# Patient Record
Sex: FEMALE | Race: WHITE | NOT HISPANIC OR LATINO | Employment: OTHER | URBAN - METROPOLITAN AREA
[De-identification: names, ages, dates, MRNs, and addresses within clinical notes are randomized per-mention and may not be internally consistent; named-entity substitution may affect disease eponyms.]

---

## 2018-08-13 RX ORDER — HYDROCHLOROTHIAZIDE 25 MG/1
25 TABLET ORAL DAILY
COMMUNITY
End: 2022-09-30 | Stop reason: SDUPTHER

## 2018-08-13 RX ORDER — CETIRIZINE HYDROCHLORIDE 10 MG/1
10 TABLET ORAL DAILY
COMMUNITY
End: 2022-08-02

## 2018-08-13 RX ORDER — ATORVASTATIN CALCIUM 20 MG/1
20 TABLET, FILM COATED ORAL DAILY
COMMUNITY
End: 2022-09-30

## 2018-08-13 RX ORDER — LISINOPRIL 10 MG/1
10 TABLET ORAL DAILY
COMMUNITY
End: 2022-08-24

## 2018-08-14 ENCOUNTER — ANESTHESIA (OUTPATIENT)
Dept: GASTROENTEROLOGY | Facility: HOSPITAL | Age: 73
End: 2018-08-14

## 2018-08-14 ENCOUNTER — ANESTHESIA EVENT (OUTPATIENT)
Dept: GASTROENTEROLOGY | Facility: HOSPITAL | Age: 73
End: 2018-08-14

## 2018-08-14 ENCOUNTER — HOSPITAL ENCOUNTER (OUTPATIENT)
Facility: HOSPITAL | Age: 73
Setting detail: HOSPITAL OUTPATIENT SURGERY
Discharge: HOME | End: 2018-08-14
Attending: INTERNAL MEDICINE | Admitting: INTERNAL MEDICINE

## 2018-08-14 ENCOUNTER — ON CAMPUS - OUTPATIENT (OUTPATIENT)
Dept: URBAN - METROPOLITAN AREA HOSPITAL 114 | Facility: HOSPITAL | Age: 73
End: 2018-08-14
Payer: COMMERCIAL

## 2018-08-14 VITALS
HEIGHT: 64 IN | BODY MASS INDEX: 32.1 KG/M2 | DIASTOLIC BLOOD PRESSURE: 56 MMHG | OXYGEN SATURATION: 99 % | HEART RATE: 61 BPM | TEMPERATURE: 97.6 F | WEIGHT: 188 LBS | RESPIRATION RATE: 16 BRPM | SYSTOLIC BLOOD PRESSURE: 110 MMHG

## 2018-08-14 DIAGNOSIS — Z12.11 ENCOUNTER FOR SCREENING FOR MALIGNANT NEOPLASM OF COLON: ICD-10-CM

## 2018-08-14 DIAGNOSIS — K57.30 DIVERTICULOSIS OF LARGE INTESTINE WITHOUT PERFORATION OR ABS: ICD-10-CM

## 2018-08-14 PROCEDURE — 25010000002 PROPOFOL 10 MG/ML EMULSION: Performed by: NURSE ANESTHETIST, CERTIFIED REGISTERED

## 2018-08-14 PROCEDURE — 45378 DIAGNOSTIC COLONOSCOPY: CPT | Performed by: INTERNAL MEDICINE

## 2018-08-14 RX ORDER — PROMETHAZINE HYDROCHLORIDE 25 MG/ML
12.5 INJECTION, SOLUTION INTRAMUSCULAR; INTRAVENOUS ONCE AS NEEDED
Status: DISCONTINUED | OUTPATIENT
Start: 2018-08-14 | End: 2018-08-14 | Stop reason: HOSPADM

## 2018-08-14 RX ORDER — PROPOFOL 10 MG/ML
VIAL (ML) INTRAVENOUS CONTINUOUS PRN
Status: DISCONTINUED | OUTPATIENT
Start: 2018-08-14 | End: 2018-08-14 | Stop reason: SURG

## 2018-08-14 RX ORDER — LIDOCAINE HYDROCHLORIDE 20 MG/ML
INJECTION, SOLUTION INFILTRATION; PERINEURAL AS NEEDED
Status: DISCONTINUED | OUTPATIENT
Start: 2018-08-14 | End: 2018-08-14 | Stop reason: SURG

## 2018-08-14 RX ORDER — PROMETHAZINE HYDROCHLORIDE 25 MG/1
25 SUPPOSITORY RECTAL ONCE AS NEEDED
Status: DISCONTINUED | OUTPATIENT
Start: 2018-08-14 | End: 2018-08-14 | Stop reason: HOSPADM

## 2018-08-14 RX ORDER — PROPOFOL 10 MG/ML
VIAL (ML) INTRAVENOUS AS NEEDED
Status: DISCONTINUED | OUTPATIENT
Start: 2018-08-14 | End: 2018-08-14 | Stop reason: SURG

## 2018-08-14 RX ORDER — SODIUM CHLORIDE, SODIUM LACTATE, POTASSIUM CHLORIDE, CALCIUM CHLORIDE 600; 310; 30; 20 MG/100ML; MG/100ML; MG/100ML; MG/100ML
1000 INJECTION, SOLUTION INTRAVENOUS CONTINUOUS
Status: DISCONTINUED | OUTPATIENT
Start: 2018-08-14 | End: 2018-08-14 | Stop reason: HOSPADM

## 2018-08-14 RX ORDER — PROMETHAZINE HYDROCHLORIDE 25 MG/1
25 TABLET ORAL ONCE AS NEEDED
Status: DISCONTINUED | OUTPATIENT
Start: 2018-08-14 | End: 2018-08-14 | Stop reason: HOSPADM

## 2018-08-14 RX ORDER — ONDANSETRON 2 MG/ML
4 INJECTION INTRAMUSCULAR; INTRAVENOUS ONCE AS NEEDED
Status: DISCONTINUED | OUTPATIENT
Start: 2018-08-14 | End: 2018-08-14 | Stop reason: HOSPADM

## 2018-08-14 RX ORDER — SODIUM CHLORIDE, SODIUM LACTATE, POTASSIUM CHLORIDE, CALCIUM CHLORIDE 600; 310; 30; 20 MG/100ML; MG/100ML; MG/100ML; MG/100ML
100 INJECTION, SOLUTION INTRAVENOUS CONTINUOUS
Status: DISCONTINUED | OUTPATIENT
Start: 2018-08-14 | End: 2018-08-14 | Stop reason: HOSPADM

## 2018-08-14 RX ADMIN — SODIUM CHLORIDE, POTASSIUM CHLORIDE, SODIUM LACTATE AND CALCIUM CHLORIDE 1000 ML: 600; 310; 30; 20 INJECTION, SOLUTION INTRAVENOUS at 10:50

## 2018-08-14 RX ADMIN — PROPOFOL 250 MCG/KG/MIN: 10 INJECTION, EMULSION INTRAVENOUS at 11:30

## 2018-08-14 RX ADMIN — LIDOCAINE HYDROCHLORIDE 50 MG: 20 INJECTION, SOLUTION INFILTRATION; PERINEURAL at 11:30

## 2018-08-14 RX ADMIN — PROPOFOL 60 MG: 10 INJECTION, EMULSION INTRAVENOUS at 11:30

## 2018-08-14 NOTE — ADDENDUM NOTE
Addendum  created 08/14/18 1350 by Eleonora Pfeiffer CRNA    Anesthesia Intra Flowsheets edited

## 2018-08-14 NOTE — H&P
Patient Care Team:  Rubén Coleman MD as PCP - General (Family Medicine)    Chief complaint hx of polyps    Subjective     History of Present Illness    Review of Systems   All other systems reviewed and are negative.       Past Medical History:   Diagnosis Date   • GERD (gastroesophageal reflux disease)    • History of colon polyps    • Hyperlipidemia    • Hypertension    • IBS (irritable bowel syndrome)      Past Surgical History:   Procedure Laterality Date   • BLADDER SLING MODIFIED, ANTERIOR AND POSTERIOR VAGINAL REPAIR     • CATARACT EXTRACTION EXTRACAPSULAR W/ INTRAOCULAR LENS IMPLANTATION Bilateral    • CHOLECYSTECTOMY  1999   • COLONOSCOPY  2013   • HYSTERECTOMY       Family History   Problem Relation Age of Onset   • Malig Hyperthermia Neg Hx      Social History   Substance Use Topics   • Smoking status: Never Smoker   • Smokeless tobacco: Not on file   • Alcohol use No     Prescriptions Prior to Admission   Medication Sig Dispense Refill Last Dose   • atorvastatin (LIPITOR) 20 MG tablet Take 20 mg by mouth Daily.   8/13/2018 at Unknown time   • cetirizine (zyrTEC) 10 MG tablet Take 10 mg by mouth Daily.   8/13/2018 at Unknown time   • hydrochlorothiazide (HYDRODIURIL) 25 MG tablet Take 25 mg by mouth Daily.   8/13/2018 at Unknown time   • lisinopril (PRINIVIL,ZESTRIL) 10 MG tablet Take 10 mg by mouth Daily.   8/13/2018 at Unknown time   • Multiple Vitamins-Minerals (MULTIVITAMIN ADULT PO) Take 1 tablet by mouth Daily.   8/13/2018 at Unknown time     Allergies:  Patient has no known allergies.    Objective      Vital Signs  Temp:  [97.6 °F (36.4 °C)] 97.6 °F (36.4 °C)  Heart Rate:  [67] 67  Resp:  [16] 16    Physical Exam   Constitutional: She is oriented to person, place, and time. She appears well-developed and well-nourished.   Eyes: Conjunctivae are normal.   Neck: Neck supple.   Cardiovascular: Normal rate and regular rhythm.    Pulmonary/Chest: Effort normal and breath sounds normal.    Abdominal: Soft. Bowel sounds are normal.   Neurological: She is alert and oriented to person, place, and time.   Skin: Skin is warm and dry.   Psychiatric: She has a normal mood and affect.       Results Review:   I reviewed the patient's new clinical results.      Assessment/Plan     Active Problems:    * No active hospital problems. *      Assessment:  (Persona history colon polyps ).     Plan:   (Colonoscopy risks, alternatives and benefits discussed with patient and the patient is agreeable to having procedure done.).       I discussed the patients findings and my recommendations with patient and nursing staff    Reggie Baum MD  08/14/18  11:28 AM

## 2018-08-14 NOTE — ANESTHESIA PREPROCEDURE EVALUATION
Anesthesia Evaluation     Patient summary reviewed and Nursing notes reviewed   NPO Solid Status: > 8 hours  NPO Liquid Status: > 8 hours           Airway   Mallampati: II  Dental - normal exam     Pulmonary - normal exam   Cardiovascular - normal exam    (+) hypertension less than 2 medications, hyperlipidemia,       Neuro/Psych  GI/Hepatic/Renal/Endo    (+) obesity,       Musculoskeletal     Abdominal    Substance History      OB/GYN          Other                        Anesthesia Plan    ASA 2     MAC     Anesthetic plan and risks discussed with patient and child.

## 2018-08-14 NOTE — ANESTHESIA POSTPROCEDURE EVALUATION
"Patient: Idania Phillips    Procedure Summary     Date:  08/14/18 Room / Location:   [facility]    Anesthesia Start:  1126 Anesthesia Stop:  1154    Procedure:  COLONOSCOPY to Cecum and Terminal Ileum (N/A ) Diagnosis:      Surgeon:  Reggie Baum MD Provider:  Tea Luna MD    Anesthesia Type:  MAC ASA Status:  2          Anesthesia Type: MAC  Last vitals  BP   105/55 (08/14/18 1202)   Temp   36.4 °C (97.6 °F) (08/14/18 1039)   Pulse   71 (08/14/18 1202)   Resp   16 (08/14/18 1202)     SpO2   96 % (08/14/18 1202)     Post Anesthesia Care and Evaluation    Patient location during evaluation: bedside  Patient participation: complete - patient participated  Level of consciousness: awake and alert  Pain management: adequate  Airway patency: patent  Anesthetic complications: No anesthetic complications    Cardiovascular status: acceptable  Respiratory status: acceptable  Hydration status: acceptable    Comments: /55 (BP Location: Left arm, Patient Position: Sitting)   Pulse 71   Temp 36.4 °C (97.6 °F) (Oral)   Resp 16   Ht 162.5 cm (63.98\")   Wt 85.3 kg (188 lb)   SpO2 96%   BMI 32.29 kg/m²       "

## 2022-08-02 RX ORDER — LEVOCETIRIZINE DIHYDROCHLORIDE 5 MG/1
TABLET, FILM COATED ORAL
Qty: 90 TABLET | Refills: 0 | Status: SHIPPED | OUTPATIENT
Start: 2022-08-02 | End: 2022-08-04 | Stop reason: SDUPTHER

## 2022-08-04 NOTE — TELEPHONE ENCOUNTER
Dr. Coleman, there is an issue with this patient's medication. She was last seen in November of 2021. Patient was previously on 80mg atorvastatin-she took a 20mg and a 40mg. At her last visit on 11/23/21 her 40mg was inactivated on her medication list. I know [employer] will stop medications on it's own, but this medication was taken off because the stop date of 11/23/21 is listed in the note under the medications. There isn't anything in the note stating you were stopping the medication though. There is another medication list on the note itself that says the 40mg was refilled on 11/23/21, but in the medication list, it was not refilled. Patient is requesting both. I scheduled her an appointment to see you not until end of sept. Do you want to fill it and continue to take if she had been taking? It is so confusing, im sorry!

## 2022-08-04 NOTE — TELEPHONE ENCOUNTER
Called patient, last ov was in jan 2021. Got her scheduled for upcoming appointment. Sending in her medication to last her to appointment

## 2022-08-04 NOTE — TELEPHONE ENCOUNTER
Caller: Idania Phillips    Relationship: Self    Best call back number:[phone]  Requested Prescriptions:   Requested Prescriptions     Pending Prescriptions Disp Refills   • atorvastatin (LIPITOR) 40 MG tablet [Pharmacy Med Name: ATORVASTATIN 40MG] 90 tablet 1     Sig: TAKE 1 TABLET BY MOUTH EVERY DAY   • atorvastatin (LIPITOR) 20 MG tablet 90 tablet      Sig: Take 1 tablet by mouth Daily.   • levocetirizine (XYZAL) 5 MG tablet 90 tablet 0     Sig: Take 1 tablet by mouth Every Evening.        [facility] [address] [facility] [phone] [facility] [phone] [facility]     Additional details provided by patient: OUT OF ATORVASTATIN    Does the patient have less than a 3 day supply:  [x] Yes  [] No    Blanca Vazquez Rep   08/04/22 12:21 EDT

## 2022-08-06 RX ORDER — LEVOCETIRIZINE DIHYDROCHLORIDE 5 MG/1
5 TABLET, FILM COATED ORAL EVERY EVENING
Qty: 90 TABLET | Refills: 0 | Status: SHIPPED | OUTPATIENT
Start: 2022-08-06 | End: 2022-09-30 | Stop reason: SDUPTHER

## 2022-08-06 RX ORDER — ATORVASTATIN CALCIUM 20 MG/1
20 TABLET, FILM COATED ORAL DAILY
Qty: 90 TABLET | Status: CANCELLED | OUTPATIENT
Start: 2022-08-06

## 2022-08-06 RX ORDER — ATORVASTATIN CALCIUM 40 MG/1
TABLET, FILM COATED ORAL
Qty: 90 TABLET | Refills: 1 | Status: SHIPPED | OUTPATIENT
Start: 2022-08-06 | End: 2022-08-24

## 2022-08-24 ENCOUNTER — OFFICE VISIT (OUTPATIENT)
Dept: FAMILY MEDICINE CLINIC | Facility: CLINIC | Age: 77
End: 2022-08-24

## 2022-08-24 VITALS
WEIGHT: 151.5 LBS | DIASTOLIC BLOOD PRESSURE: 82 MMHG | OXYGEN SATURATION: 97 % | HEIGHT: 61 IN | HEART RATE: 90 BPM | BODY MASS INDEX: 28.6 KG/M2 | SYSTOLIC BLOOD PRESSURE: 124 MMHG | TEMPERATURE: 97.7 F

## 2022-08-24 DIAGNOSIS — J40 BRONCHITIS: Primary | ICD-10-CM

## 2022-08-24 DIAGNOSIS — I10 PRIMARY HYPERTENSION: ICD-10-CM

## 2022-08-24 DIAGNOSIS — Z78.0 ENCOUNTER FOR OSTEOPOROSIS SCREENING IN ASYMPTOMATIC POSTMENOPAUSAL PATIENT: ICD-10-CM

## 2022-08-24 DIAGNOSIS — J30.2 SEASONAL ALLERGIES: ICD-10-CM

## 2022-08-24 DIAGNOSIS — Z00.00 ENCOUNTER FOR SUBSEQUENT ANNUAL WELLNESS VISIT (AWV) IN MEDICARE PATIENT: ICD-10-CM

## 2022-08-24 DIAGNOSIS — Z13.820 ENCOUNTER FOR OSTEOPOROSIS SCREENING IN ASYMPTOMATIC POSTMENOPAUSAL PATIENT: ICD-10-CM

## 2022-08-24 DIAGNOSIS — E78.2 MIXED HYPERLIPIDEMIA: ICD-10-CM

## 2022-08-24 DIAGNOSIS — Z91.81 AT LOW RISK FOR FALL: ICD-10-CM

## 2022-08-24 DIAGNOSIS — M15.9 PRIMARY OSTEOARTHRITIS INVOLVING MULTIPLE JOINTS: ICD-10-CM

## 2022-08-24 LAB
EXPIRATION DATE: NORMAL
EXPIRATION DATE: NORMAL
FLUAV AG NPH QL: NEGATIVE
FLUBV AG NPH QL: NEGATIVE
INTERNAL CONTROL: NORMAL
INTERNAL CONTROL: NORMAL
Lab: NORMAL
Lab: NORMAL
SARS-COV-2 AG UPPER RESP QL IA.RAPID: NOT DETECTED

## 2022-08-24 PROCEDURE — 1159F MED LIST DOCD IN RCRD: CPT | Performed by: NURSE PRACTITIONER

## 2022-08-24 PROCEDURE — 87426 SARSCOV CORONAVIRUS AG IA: CPT | Performed by: NURSE PRACTITIONER

## 2022-08-24 PROCEDURE — 1170F FXNL STATUS ASSESSED: CPT | Performed by: NURSE PRACTITIONER

## 2022-08-24 PROCEDURE — 99213 OFFICE O/P EST LOW 20 MIN: CPT | Performed by: NURSE PRACTITIONER

## 2022-08-24 PROCEDURE — G0439 PPPS, SUBSEQ VISIT: HCPCS | Performed by: NURSE PRACTITIONER

## 2022-08-24 PROCEDURE — 87804 INFLUENZA ASSAY W/OPTIC: CPT | Performed by: NURSE PRACTITIONER

## 2022-08-24 RX ORDER — DIPHENOXYLATE HYDROCHLORIDE AND ATROPINE SULFATE 2.5; .025 MG/1; MG/1
TABLET ORAL
COMMUNITY

## 2022-08-24 RX ORDER — ACETAMINOPHEN 500 MG
TABLET ORAL
COMMUNITY
End: 2022-09-30

## 2022-08-24 RX ORDER — LORATADINE 10 MG/1
TABLET ORAL
COMMUNITY
End: 2022-09-30

## 2022-08-24 RX ORDER — CEFDINIR 300 MG/1
300 CAPSULE ORAL 2 TIMES DAILY
Qty: 20 CAPSULE | Refills: 0 | Status: SHIPPED | OUTPATIENT
Start: 2022-08-24 | End: 2022-09-30

## 2022-08-24 NOTE — ASSESSMENT & PLAN NOTE
Updated annual wellness visit checklist.  Immunizations discussed. Patient to follow up with pharmacy about Shingrix, Td, and Covid booster. Patient is past routine screening age. DEXA ordered.  Recommend yearly dental and eye exams. Also discussed monitoring of blood pressure and lipids. We addressed patient self-assessment of health status, frailty, and physical functioning. We reviewed psychosocial risks, behavioral risks, instrumental activities of daily living, and patient health risk assessment. Patient was given a personalized prevention plan.

## 2022-08-24 NOTE — PROGRESS NOTES
QUICK REFERENCE INFORMATION:  The ABCs of the Annual Wellness Visit    Subsequent Medicare Wellness Visit      HEALTH RISK ASSESSMENT    1945    Recent Hospitalizations:  No hospitalization(s) within the last year..    Current Medical Providers:  Patient Care Team:  Rubén Coleman MD as PCP - General (Family Medicine)    Smoking Status:  Social History     Tobacco Use   Smoking Status Never Smoker   Smokeless Tobacco Never Used       Alcohol Consumption:  Social History     Substance and Sexual Activity   Alcohol Use No       Depression Screen:   PHQ-2/PHQ-9 Depression Screening 8/24/2022   Little Interest or Pleasure in Doing Things 0-->not at all   Feeling Down, Depressed or Hopeless 0-->not at all   PHQ-9: Brief Depression Severity Measure Score 0       Health Habits and Functional and Cognitive Screening:  Functional & Cognitive Status 8/24/2022   Do you have difficulty preparing food and eating? No   Do you have difficulty bathing yourself, getting dressed or grooming yourself? No   Do you have difficulty using the toilet? No   Do you have difficulty moving around from place to place? No   Do you have trouble with steps or getting out of a bed or a chair? No   Current Diet Well Balanced Diet   Dental Exam Up to date   Eye Exam Not up to date   Exercise (times per week) 5 times per week   Current Exercises Include Yard Work   Do you need help using the phone?  No   Are you deaf or do you have serious difficulty hearing?  No   Do you need help with transportation? No   Do you need help shopping? No   Do you need help preparing meals?  No   Do you need help with housework?  No   Do you need help with laundry? No   Do you need help taking your medications? No   Do you need help managing money? No   Do you ever drive or ride in a car without wearing a seat belt? No   Have you felt unusual stress, anger or loneliness in the last month? No   Who do you live with? Spouse   If you need help, do you have  trouble finding someone available to you? No   Have you been bothered in the last four weeks by sexual problems? No   Do you have difficulty concentrating, remembering or making decisions? No       Fall Risk Screen:  VELIA Fall Risk Assessment was completed, and patient is at LOW risk for falls.Assessment completed on:8/24/2022    ACE III MINI        Does the patient have evidence of cognitive impairment? No    No opioid medication identified on active medication list. I have reviewed chart for other potential  high risk medication/s and harmful drug interactions in the elderly.          Aspirin use counseling: Does not need ASA (and currently is not on it)    Recent Lab Results:  CMP:     HbA1c:  No results found for: HGBA1C  Microalbumin:  No results found for: MICROALBUR, POCMALB, POCCREAT  Lipid Panel  No results found for: CHOL, TRIG, HDL, LDL, AST, ALT    Visual Acuity:  No exam data present    Age-appropriate Screening Schedule:  Refer to the list below for future screening recommendations based on patient's age, sex and/or medical conditions. Orders for these recommended tests are listed in the plan section. The patient has been provided with a written plan.    Health Maintenance   Topic Date Due   • TDAP/TD VACCINES (1 - Tdap) Never done   • ZOSTER VACCINE (2 of 3) 03/13/2015   • DXA SCAN  05/15/2021   • LIPID PANEL  Never done   • INFLUENZA VACCINE  10/01/2022        Guzman Phillips is a 77 y.o. female who presents for a Subsequent Wellness Visit.    The following portions of the patient's history were reviewed and updated as appropriate: allergies, current medications, past family history, past medical history, past social history, past surgical history and problem list.    Outpatient Medications Prior to Visit   Medication Sig Dispense Refill   • acetaminophen (TYLENOL) 500 MG tablet Take  by mouth.     • atorvastatin (LIPITOR) 20 MG tablet Take 20 mg by mouth Daily.     • Calcium  "Carbonate-Vitamin D (CALCIUM-CARB 600 + D PO) Take  by mouth.     • hydrochlorothiazide (HYDRODIURIL) 25 MG tablet Take 25 mg by mouth Daily.     • levocetirizine (XYZAL) 5 MG tablet Take 1 tablet by mouth Every Evening. 90 tablet 0   • loratadine (CLARITIN) 10 MG tablet Take  by mouth.     • multivitamin (THERAGRAN) tablet tablet Take  by mouth.     • atorvastatin (LIPITOR) 40 MG tablet TAKE 1 TABLET BY MOUTH EVERY DAY 90 tablet 1   • lisinopril (PRINIVIL,ZESTRIL) 10 MG tablet Take 10 mg by mouth Daily.     • Multiple Vitamins-Minerals (MULTIVITAMIN ADULT PO) Take 1 tablet by mouth Daily.       No facility-administered medications prior to visit.       Patient Active Problem List   Diagnosis   • Bronchitis   • Encounter for subsequent annual wellness visit (AWV) in Medicare patient   • Primary hypertension   • Mixed hyperlipidemia   • Primary osteoarthritis involving multiple joints   • Seasonal allergies   • At low risk for fall       Advance Care Planning:  ACP discussion was held with the patient during this visit. Patient does not have an advance directive, information provided.    Identification of Risk Factors:  Risk factors include: Advance Directive Discussion  Cardiovascular risk  Chronic Pain   Immunizations Discussed/Encouraged (specific immunizations; Td, Shingrix and COVID19 )  Obesity/Overweight   Osteoporosis Risk.    Compared to one year ago, the patient feels her physical health is the same.  Compared to one year ago, the patient feels her mental health is the same.    Objective       Vitals:    08/24/22 1259   BP: 124/82   Pulse: 90   Temp: 97.7 °F (36.5 °C)   SpO2: 97%   Weight: 68.7 kg (151 lb 8 oz)   Height: 154.9 cm (61\")   PainSc:   4   PainLoc: Back     BMI Readings from Last 1 Encounters:   08/24/22 28.63 kg/m²   BMI is above normal parameters. Recommendations include: educational material, exercise counseling and nutrition counseling      Assessment & Plan   Problem List Items Addressed " This Visit        Advance Directives and General Issues    At low risk for fall       Allergies and Adverse Reactions    Seasonal allergies       Cardiac and Vasculature    Primary hypertension    Relevant Medications    hydrochlorothiazide (HYDRODIURIL) 25 MG tablet    Mixed hyperlipidemia    Relevant Medications    atorvastatin (LIPITOR) 20 MG tablet       Musculoskeletal and Injuries    Primary osteoarthritis involving multiple joints       Pulmonary and Pneumonias    Bronchitis - Primary    Current Assessment & Plan     Flu and COVID both negative today in clinic.  Antibiotic as prescribed.  Tylenol and ibuprofen as needed pain fever.  Continue with allergy meds.  Fluids and rest encouraged.  Mucinex DM as needed cough congestion.  Risk of meds discussed understood.  Return in 2 days if no improvement, sooner if worsens.  Return to clinic or ED with any issues or concerns.         Relevant Medications    levocetirizine (XYZAL) 5 MG tablet    loratadine (CLARITIN) 10 MG tablet    cefdinir (OMNICEF) 300 MG capsule    Other Relevant Orders    POCT MARYA SARS-CoV-2 Antigen BANDAR (Completed)    POC Influenza A / B (Completed)       Other    Encounter for subsequent annual wellness visit (AWV) in Medicare patient    Current Assessment & Plan     Updated annual wellness visit checklist.  Immunizations discussed. Patient to follow up with pharmacy about Shingrix, Td, and Covid booster. Patient is past routine screening age. DEXA ordered.  Recommend yearly dental and eye exams. Also discussed monitoring of blood pressure and lipids. We addressed patient self-assessment of health status, frailty, and physical functioning. We reviewed psychosocial risks, behavioral risks, instrumental activities of daily living, and patient health risk assessment. Patient was given a personalized prevention plan.              Other Visit Diagnoses     Encounter for osteoporosis screening in asymptomatic postmenopausal patient        Relevant  Orders    DEXA Bone Density Axial        Patient Self-Management and Personalized Health Advice  The patient has been provided with information about: diet, exercise, prevention of cardiac or vascular disease, designing advance directives and mental health concerns and preventive services including:   · Annual Wellness Visit (AWV)  · Depression Screening (15 minutes face to face, Code ).    Outpatient Encounter Medications as of 8/24/2022   Medication Sig Dispense Refill   • acetaminophen (TYLENOL) 500 MG tablet Take  by mouth.     • atorvastatin (LIPITOR) 20 MG tablet Take 20 mg by mouth Daily.     • Calcium Carbonate-Vitamin D (CALCIUM-CARB 600 + D PO) Take  by mouth.     • cefdinir (OMNICEF) 300 MG capsule Take 1 capsule by mouth 2 (Two) Times a Day. 20 capsule 0   • hydrochlorothiazide (HYDRODIURIL) 25 MG tablet Take 25 mg by mouth Daily.     • levocetirizine (XYZAL) 5 MG tablet Take 1 tablet by mouth Every Evening. 90 tablet 0   • loratadine (CLARITIN) 10 MG tablet Take  by mouth.     • multivitamin (THERAGRAN) tablet tablet Take  by mouth.     • [DISCONTINUED] atorvastatin (LIPITOR) 40 MG tablet TAKE 1 TABLET BY MOUTH EVERY DAY 90 tablet 1   • [DISCONTINUED] lisinopril (PRINIVIL,ZESTRIL) 10 MG tablet Take 10 mg by mouth Daily.     • [DISCONTINUED] Multiple Vitamins-Minerals (MULTIVITAMIN ADULT PO) Take 1 tablet by mouth Daily.       No facility-administered encounter medications on file as of 8/24/2022.       Follow Up:  Return if symptoms worsen or fail to improve.     There are no Patient Instructions on file for this visit.    An After Visit Summary and PPPS with all of these plans were given to the patient.       I have reviewed and edited information documented by wellness nurse and documentation on diagnoses is my own.

## 2022-08-24 NOTE — ASSESSMENT & PLAN NOTE
Flu and COVID both negative today in clinic.  Antibiotic as prescribed.  Tylenol and ibuprofen as needed pain fever.  Continue with allergy meds.  Fluids and rest encouraged.  Mucinex DM as needed cough congestion.  Risk of meds discussed understood.  Return in 2 days if no improvement, sooner if worsens.  Return to clinic or ED with any issues or concerns.

## 2022-08-24 NOTE — PROGRESS NOTES
"Chief Complaint  Cough and Medicare Wellness-subsequent    Subjective          Idania Phillips presents to [facility]  History of Present Illness     States about a week ago she developed congestion in her head.  States it is since moved down and is now on her lungs with a productive cough.  States she does have some sinus drainage still but states mainly it is in the lungs.  No fever but states she has felt chilled at times.  No sick contacts that she is aware of.  She states she has been vaccinated against COVID.  No chest pain no chest pressure no shortness of breath no trouble breathing no wheezing.  No GI or urinary issues.    Objective   Vital Signs:   /82   Pulse 90   Temp 97.7 °F (36.5 °C)   Ht 154.9 cm (61\")   Wt 68.7 kg (151 lb 8 oz)   SpO2 97%   BMI 28.63 kg/m²     Body mass index is 28.63 kg/m².    Review of Systems   Constitutional: Positive for chills. Negative for fever.   HENT: Positive for congestion. Negative for sinus pressure, sneezing, sore throat and swollen glands.    Respiratory: Positive for cough. Negative for shortness of breath and wheezing.    Cardiovascular: Negative for chest pain.   Gastrointestinal: Negative for abdominal pain, constipation, diarrhea, nausea and vomiting.   Genitourinary: Negative for dysuria and frequency.   Musculoskeletal: Negative for back pain.   Skin: Negative for rash.   Neurological: Negative for dizziness and headache.       Past History:  Medical History: has a past medical history of Allergic rhinitis due to pollen, Carpal tunnel syndrome, Cataracts, bilateral, Colon polyp, hyperplastic, Degenerative joint disease involving multiple joints, Diverticulosis (2013), Gallstones, GERD (gastroesophageal reflux disease), H/O measles, H/O: pneumonia, High cholesterol, History of colon polyps, Hyperlipidemia, Hypertension (2014), IBS (irritable bowel syndrome), Intermittent asthma, Internal hemorrhoids, Mild depression, Mild " edema, Neck pain (2008), Obesity, Osteoarthritis of both knees, Overactive bladder, Pure hypercholesterolemia, Seasonal allergies, Shingles (07/05/2012), and Uterine prolapse.   Surgical History: has a past surgical history that includes Colonoscopy (2013); bladder sling modified, anterior and posterior vaginal repair; Hysterectomy; Cholecystectomy (1999); Cataract extraction, extracapsular w/ intraocular lens implant (Bilateral); and Colonoscopy (N/A, 8/14/2018).   Family History: family history includes Arthritis in her mother; Asthma in her mother; COPD in her mother; Cancer in her brother; Depression in her brother; Diabetes in her brother; Hearing loss in her brother; High cholesterol in her mother; Hypertension in her mother; Kidney disease in her brother; Lung cancer in her father; Mental illness in her brother; Obesity in her brother; Prostate cancer in her brother.   Social History: reports that she has never smoked. She has never used smokeless tobacco. She reports that she does not drink alcohol and does not use drugs.    PHQ-2 Depression Screening  Little interest or pleasure in doing things? 0-->not at all   Feeling down, depressed, or hopeless? 0-->not at all   PHQ-2 Total Score 0        PHQ-9 Depression Screening  Little interest or pleasure in doing things? 0-->not at all   Feeling down, depressed, or hopeless? 0-->not at all   Trouble falling or staying asleep, or sleeping too much?     Feeling tired or having little energy?     Poor appetite or overeating?     Feeling bad about yourself - or that you are a failure or have let yourself or your family down?     Trouble concentrating on things, such as reading the newspaper or watching television?     Moving or speaking so slowly that other people could have noticed? Or the opposite - being so fidgety or restless that you have been moving around a lot more than usual?     Thoughts that you would be better off dead, or of hurting yourself in some way?      PHQ-9 Total Score 0   If you checked off any problems, how difficult have these problems made it for you to do your work, take care of things at home, or get along with other people?       PHQ-9 Total Score: 0      Patient screened positive for depression based on a PHQ-9 score of 0 on 8/24/2022. Follow-up recommendations include:       Current Outpatient Medications:   •  acetaminophen (TYLENOL) 500 MG tablet, Take  by mouth., Disp: , Rfl:   •  atorvastatin (LIPITOR) 20 MG tablet, Take 20 mg by mouth Daily., Disp: , Rfl:   •  Calcium Carbonate-Vitamin D (CALCIUM-CARB 600 + D PO), Take  by mouth., Disp: , Rfl:   •  cefdinir (OMNICEF) 300 MG capsule, Take 1 capsule by mouth 2 (Two) Times a Day., Disp: 20 capsule, Rfl: 0  •  hydrochlorothiazide (HYDRODIURIL) 25 MG tablet, Take 25 mg by mouth Daily., Disp: , Rfl:   •  levocetirizine (XYZAL) 5 MG tablet, Take 1 tablet by mouth Every Evening., Disp: 90 tablet, Rfl: 0  •  loratadine (CLARITIN) 10 MG tablet, Take  by mouth., Disp: , Rfl:   •  multivitamin (THERAGRAN) tablet tablet, Take  by mouth., Disp: , Rfl:    (Not in a hospital admission)     Allergies: Patient has no known allergies.    Physical Exam  Constitutional:       Appearance: Normal appearance.   HENT:      Right Ear: Tympanic membrane, ear canal and external ear normal.      Left Ear: Tympanic membrane, ear canal and external ear normal.      Nose: Nose normal.      Mouth/Throat:      Mouth: Mucous membranes are moist.      Pharynx: Oropharynx is clear.   Cardiovascular:      Rate and Rhythm: Normal rate and regular rhythm.      Heart sounds: Normal heart sounds.   Pulmonary:      Effort: Pulmonary effort is normal.      Breath sounds: Normal breath sounds.   Neurological:      General: No focal deficit present.      Mental Status: She is alert and oriented to person, place, and time. Mental status is at baseline.   Psychiatric:         Mood and Affect: Mood normal.         Behavior: Behavior normal.          Thought Content: Thought content normal.         Judgment: Judgment normal.          Result Review :                   Assessment and Plan    Diagnoses and all orders for this visit:    1. Bronchitis (Primary)  Assessment & Plan:  Flu and COVID both negative today in clinic.  Antibiotic as prescribed.  Tylenol and ibuprofen as needed pain fever.  Continue with allergy meds.  Fluids and rest encouraged.  Mucinex DM as needed cough congestion.  Risk of meds discussed understood.  Return in 2 days if no improvement, sooner if worsens.  Return to clinic or ED with any issues or concerns.    Orders:  -     cefdinir (OMNICEF) 300 MG capsule; Take 1 capsule by mouth 2 (Two) Times a Day.  Dispense: 20 capsule; Refill: 0  -     POCT MARYA SARS-CoV-2 Antigen BANDAR  -     POC Influenza A / B    2. Encounter for osteoporosis screening in asymptomatic postmenopausal patient  -     DEXA Bone Density Axial    3. Encounter for subsequent annual wellness visit (AWV) in Medicare patient  Assessment & Plan:  Updated annual wellness visit checklist.  Immunizations discussed. Patient to follow up with pharmacy about Shingrix, Td, and Covid booster. Patient is past routine screening age. DEXA ordered.  Recommend yearly dental and eye exams. Also discussed monitoring of blood pressure and lipids. We addressed patient self-assessment of health status, frailty, and physical functioning. We reviewed psychosocial risks, behavioral risks, instrumental activities of daily living, and patient health risk assessment. Patient was given a personalized prevention plan.         4. Primary hypertension    5. Mixed hyperlipidemia    6. Primary osteoarthritis involving multiple joints    7. Seasonal allergies    8. At low risk for fall                   Follow Up   Return if symptoms worsen or fail to improve.  Patient was given instructions and counseling regarding her condition or for health maintenance advice. Please see specific information  pulled into the AVS if appropriate.     CHECO Batres

## 2022-09-30 ENCOUNTER — OFFICE VISIT (OUTPATIENT)
Dept: FAMILY MEDICINE CLINIC | Facility: CLINIC | Age: 77
End: 2022-09-30

## 2022-09-30 VITALS
HEART RATE: 67 BPM | WEIGHT: 152 LBS | BODY MASS INDEX: 28.7 KG/M2 | OXYGEN SATURATION: 96 % | SYSTOLIC BLOOD PRESSURE: 140 MMHG | HEIGHT: 61 IN | DIASTOLIC BLOOD PRESSURE: 68 MMHG

## 2022-09-30 DIAGNOSIS — J30.1 ALLERGIC RHINITIS DUE TO POLLEN, UNSPECIFIED SEASONALITY: ICD-10-CM

## 2022-09-30 DIAGNOSIS — I10 PRIMARY HYPERTENSION: Primary | ICD-10-CM

## 2022-09-30 DIAGNOSIS — Z00.00 ROUTINE GENERAL MEDICAL EXAMINATION AT A HEALTH CARE FACILITY: ICD-10-CM

## 2022-09-30 DIAGNOSIS — E78.2 MIXED HYPERLIPIDEMIA: ICD-10-CM

## 2022-09-30 PROCEDURE — G0008 ADMIN INFLUENZA VIRUS VAC: HCPCS | Performed by: FAMILY MEDICINE

## 2022-09-30 PROCEDURE — 99214 OFFICE O/P EST MOD 30 MIN: CPT | Performed by: FAMILY MEDICINE

## 2022-09-30 PROCEDURE — 90662 IIV NO PRSV INCREASED AG IM: CPT | Performed by: FAMILY MEDICINE

## 2022-09-30 RX ORDER — FLUTICASONE PROPIONATE 50 MCG
2 SPRAY, SUSPENSION (ML) NASAL DAILY
Qty: 16 G | Refills: 5 | Status: SHIPPED | OUTPATIENT
Start: 2022-09-30

## 2022-09-30 RX ORDER — HYDROCHLOROTHIAZIDE 25 MG/1
25 TABLET ORAL DAILY
Qty: 90 TABLET | Refills: 3 | Status: SHIPPED | OUTPATIENT
Start: 2022-09-30

## 2022-09-30 RX ORDER — ATORVASTATIN CALCIUM 20 MG/1
20 TABLET, FILM COATED ORAL DAILY
Qty: 90 TABLET | Refills: 3 | Status: SHIPPED | OUTPATIENT
Start: 2022-09-30

## 2022-09-30 RX ORDER — LEVOCETIRIZINE DIHYDROCHLORIDE 5 MG/1
5 TABLET, FILM COATED ORAL EVERY EVENING
Qty: 90 TABLET | Refills: 3 | Status: SHIPPED | OUTPATIENT
Start: 2022-09-30

## 2022-09-30 NOTE — PROGRESS NOTES
Follow Up Office Visit      Date of Visit:  2022   Patient Name: Idania Phillips  : 1945   MRN: [id number]     Chief Complaint:    Chief Complaint   Patient presents with   • Annual Exam       History of Present Illness: Idania Phillips is a 77 y.o. female who is here today for follow up.  Patient comes in today to review her current medications and medical conditions.  She is currently being treated for hypertension.  Needs medication refills.  Due for appropriate labs to be checked.  Hypertension controlled.  Patient also needing refills on her lipid medication.  Lipid has been stable.  Patient with some worsening allergic rhinitis.  On antihistamine.        Subjective      Review of Systems:   Review of Systems   Constitutional: Negative for fatigue and fever.   HENT: Negative for congestion and ear pain.    Respiratory: Negative for apnea, cough, chest tightness and shortness of breath.    Cardiovascular: Negative for chest pain.   Gastrointestinal: Negative for abdominal pain, constipation, diarrhea and nausea.   Musculoskeletal: Negative for arthralgias.   Psychiatric/Behavioral: Negative for depressed mood and stress.       Past Medical History:   Past Medical History:   Diagnosis Date   • Allergic rhinitis due to pollen    • Carpal tunnel syndrome    • Cataracts, bilateral    • Colon polyp, hyperplastic    • Degenerative joint disease involving multiple joints    • Diverticulosis     DX ON COLONOSCOPY   • Gallstones    • GERD (gastroesophageal reflux disease)    • H/O measles    • H/O: pneumonia    • High cholesterol     DRUG THERAPY   • History of colon polyps    • Hyperlipidemia    • Hypertension     DRUG THERAPY   • IBS (irritable bowel syndrome)    • Intermittent asthma     PRN RESCUE INHALER   • Internal hemorrhoids     DX ON COLONOSCOPY    • Mild depression    • Mild edema     MILD LE EDEMA ND MILD VENOUS STASIS   • Neck pain     CERVICAL MYALGIA   • Obesity    •  Osteoarthritis of both knees     BACK AND HANDS-OCCASIONAL TYLENOL   • Overactive bladder    • Pure hypercholesterolemia    • Seasonal allergies     DRUG THERAPY -OTC CLARITIN   • Shingles 07/05/2012   • Uterine prolapse     SURGICAL REPAIR SCHCEDULED FOR OCTOBER 17, 2016       Past Surgical History:   Past Surgical History:   Procedure Laterality Date   • BLADDER SLING MODIFIED, ANTERIOR AND POSTERIOR VAGINAL REPAIR     • CATARACT EXTRACTION EXTRACAPSULAR W/ INTRAOCULAR LENS IMPLANTATION Bilateral    • CHOLECYSTECTOMY  1999   • COLONOSCOPY  2013   • COLONOSCOPY N/A 8/14/2018    Procedure: COLONOSCOPY to Cecum and Terminal Ileum;  Surgeon: Reggie Baum MD;  Location: [facility] ENDOSCOPY;  Service: Gastroenterology   • HYSTERECTOMY         Family History:   Family History   Problem Relation Age of Onset   • Hypertension Mother    • COPD Mother    • Arthritis Mother    • High cholesterol Mother    • Asthma Mother    • Lung cancer Father    • Kidney disease Brother    • Cancer Brother         BASAL CELL   • Obesity Brother    • Depression Brother    • Mental illness Brother    • Hearing loss Brother    • Diabetes Brother    • Prostate cancer Brother    • Malig Hyperthermia Neg Hx        Social History:   Social History     Socioeconomic History   • Marital status:    • Number of children: 2   • Highest education level: Some college, no degree   Tobacco Use   • Smoking status: Never Smoker   • Smokeless tobacco: Never Used   Vaping Use   • Vaping Use: Never used   Substance and Sexual Activity   • Alcohol use: No   • Drug use: No   • Sexual activity: Defer       Medications:     Current Outpatient Medications:   •  atorvastatin (LIPITOR) 20 MG tablet, Take 1 tablet by mouth Daily., Disp: 90 tablet, Rfl: 3  •  Calcium Carbonate-Vitamin D (CALCIUM-CARB 600 + D PO), Take  by mouth., Disp: , Rfl:   •  hydroCHLOROthiazide (HYDRODIURIL) 25 MG tablet, Take 1 tablet by mouth Daily., Disp: 90 tablet, Rfl: 3  •   "levocetirizine (XYZAL) 5 MG tablet, Take 1 tablet by mouth Every Evening., Disp: 90 tablet, Rfl: 3  •  multivitamin (THERAGRAN) tablet tablet, Take  by mouth., Disp: , Rfl:   •  fluticasone (Flonase) 50 MCG/ACT nasal spray, 2 sprays into the nostril(s) as directed by provider Daily., Disp: 16 g, Rfl: 5    Allergies:   No Known Allergies    Objective     Physical Exam:  Vital Signs:   Vitals:    09/30/22 1041   BP: 140/68   Pulse: 67   SpO2: 96%   Weight: 68.9 kg (152 lb)   Height: 154.9 cm (61\")     Body mass index is 28.72 kg/m².     Physical Exam  Vitals and nursing note reviewed.   Constitutional:       General: She is not in acute distress.     Appearance: Normal appearance. She is not ill-appearing.   HENT:      Head: Normocephalic and atraumatic.      Right Ear: Tympanic membrane and ear canal normal.      Left Ear: Tympanic membrane and ear canal normal.      Nose: Nose normal.   Cardiovascular:      Rate and Rhythm: Normal rate and regular rhythm.      Heart sounds: Normal heart sounds.   Pulmonary:      Effort: Pulmonary effort is normal.      Breath sounds: Normal breath sounds.   Neurological:      Mental Status: She is alert and oriented to person, place, and time. Mental status is at baseline.   Psychiatric:         Mood and Affect: Mood normal.         Procedures      Assessment / Plan      Assessment/Plan:   Diagnoses and all orders for this visit:    1. Primary hypertension (Primary)  -     CBC Auto Differential; Future  -     Comprehensive Metabolic Panel; Future  -     Lipid Panel; Future  -     TSH; Future  -     CBC Auto Differential  -     Comprehensive Metabolic Panel  -     Lipid Panel  -     TSH    2. Mixed hyperlipidemia  -     CBC Auto Differential; Future  -     Comprehensive Metabolic Panel; Future  -     Lipid Panel; Future  -     TSH; Future  -     CBC Auto Differential  -     Comprehensive Metabolic Panel  -     Lipid Panel  -     TSH    3. Routine general medical examination at a " health care facility  -     CBC Auto Differential; Future  -     Comprehensive Metabolic Panel; Future  -     Lipid Panel; Future  -     TSH; Future  -     CBC Auto Differential  -     Comprehensive Metabolic Panel  -     Lipid Panel  -     TSH    4. Allergic rhinitis due to pollen, unspecified seasonality    Other orders  -     hydroCHLOROthiazide (HYDRODIURIL) 25 MG tablet; Take 1 tablet by mouth Daily.  Dispense: 90 tablet; Refill: 3  -     levocetirizine (XYZAL) 5 MG tablet; Take 1 tablet by mouth Every Evening.  Dispense: 90 tablet; Refill: 3  -     atorvastatin (LIPITOR) 20 MG tablet; Take 1 tablet by mouth Daily.  Dispense: 90 tablet; Refill: 3  -     fluticasone (Flonase) 50 MCG/ACT nasal spray; 2 sprays into the nostril(s) as directed by provider Daily.  Dispense: 16 g; Refill: 5  -     Fluzone High-Dose 65+yrs ([id number])         Gave flu vaccine today.  Start Flonase for allergies.  Continue her other allergy medication.  Continue medications for hypertension and hyperlipidemia.  Check labs.    Follow Up:   No follow-ups on file.    Rubén Coleman  [facility] [place]

## 2022-10-01 LAB
ALBUMIN SERPL-MCNC: 4.9 G/DL (ref 3.7–4.7)
ALBUMIN/GLOB SERPL: 1.6 {RATIO} (ref 1.2–2.2)
ALP SERPL-CCNC: 117 IU/L (ref 44–121)
ALT SERPL-CCNC: 12 IU/L (ref 0–32)
AST SERPL-CCNC: 28 IU/L (ref 0–40)
BASOPHILS # BLD AUTO: 0.1 X10E3/UL (ref 0–0.2)
BASOPHILS NFR BLD AUTO: 1 %
BILIRUB SERPL-MCNC: 0.6 MG/DL (ref 0–1.2)
BUN SERPL-MCNC: 26 MG/DL (ref 8–27)
BUN/CREAT SERPL: 26 (ref 12–28)
CALCIUM SERPL-MCNC: 11 MG/DL (ref 8.7–10.3)
CHLORIDE SERPL-SCNC: 99 MMOL/L (ref 96–106)
CHOLEST SERPL-MCNC: 180 MG/DL (ref 100–199)
CO2 SERPL-SCNC: 25 MMOL/L (ref 20–29)
CREAT SERPL-MCNC: 0.99 MG/DL (ref 0.57–1)
EGFRCR SERPLBLD CKD-EPI 2021: 59 ML/MIN/1.73
EOSINOPHIL # BLD AUTO: 0.2 X10E3/UL (ref 0–0.4)
EOSINOPHIL NFR BLD AUTO: 2 %
ERYTHROCYTE [DISTWIDTH] IN BLOOD BY AUTOMATED COUNT: 13 % (ref 11.7–15.4)
GLOBULIN SER CALC-MCNC: 3 G/DL (ref 1.5–4.5)
GLUCOSE SERPL-MCNC: 88 MG/DL (ref 70–99)
HCT VFR BLD AUTO: 41.6 % (ref 34–46.6)
HDLC SERPL-MCNC: 67 MG/DL
HGB BLD-MCNC: 14 G/DL (ref 11.1–15.9)
IMM GRANULOCYTES # BLD AUTO: 0 X10E3/UL (ref 0–0.1)
IMM GRANULOCYTES NFR BLD AUTO: 0 %
LDLC SERPL CALC-MCNC: 96 MG/DL (ref 0–99)
LYMPHOCYTES # BLD AUTO: 3.1 X10E3/UL (ref 0.7–3.1)
LYMPHOCYTES NFR BLD AUTO: 31 %
MCH RBC QN AUTO: 32.3 PG (ref 26.6–33)
MCHC RBC AUTO-ENTMCNC: 33.7 G/DL (ref 31.5–35.7)
MCV RBC AUTO: 96 FL (ref 79–97)
MONOCYTES # BLD AUTO: 0.8 X10E3/UL (ref 0.1–0.9)
MONOCYTES NFR BLD AUTO: 8 %
NEUTROPHILS # BLD AUTO: 6 X10E3/UL (ref 1.4–7)
NEUTROPHILS NFR BLD AUTO: 58 %
PLATELET # BLD AUTO: 290 X10E3/UL (ref 150–450)
POTASSIUM SERPL-SCNC: 4.9 MMOL/L (ref 3.5–5.2)
PROT SERPL-MCNC: 7.9 G/DL (ref 6–8.5)
RBC # BLD AUTO: 4.33 X10E6/UL (ref 3.77–5.28)
SODIUM SERPL-SCNC: 141 MMOL/L (ref 134–144)
TRIGL SERPL-MCNC: 93 MG/DL (ref 0–149)
TSH SERPL DL<=0.005 MIU/L-ACNC: 2.67 UIU/ML (ref 0.45–4.5)
VLDLC SERPL CALC-MCNC: 17 MG/DL (ref 5–40)
WBC # BLD AUTO: 10.1 X10E3/UL (ref 3.4–10.8)

## 2023-11-01 RX ORDER — LEVOCETIRIZINE DIHYDROCHLORIDE 5 MG/1
5 TABLET, FILM COATED ORAL EVERY EVENING
Qty: 90 TABLET | Refills: 0 | Status: SHIPPED | OUTPATIENT
Start: 2023-11-01

## 2023-11-01 RX ORDER — HYDROCHLOROTHIAZIDE 25 MG/1
25 TABLET ORAL DAILY
Qty: 90 TABLET | Refills: 0 | Status: SHIPPED | OUTPATIENT
Start: 2023-11-01

## 2023-11-01 RX ORDER — ATORVASTATIN CALCIUM 20 MG/1
20 TABLET, FILM COATED ORAL DAILY
Qty: 90 TABLET | Refills: 0 | Status: SHIPPED | OUTPATIENT
Start: 2023-11-01

## 2023-11-01 RX ORDER — FLUTICASONE PROPIONATE 50 MCG
SPRAY, SUSPENSION (ML) NASAL
Qty: 16 G | Refills: 0 | Status: SHIPPED | OUTPATIENT
Start: 2023-11-01

## 2023-11-27 RX ORDER — FLUTICASONE PROPIONATE 50 MCG
SPRAY, SUSPENSION (ML) NASAL
Qty: 16 G | Refills: 0 | OUTPATIENT
Start: 2023-11-27

## 2024-01-09 ENCOUNTER — OFFICE VISIT (OUTPATIENT)
Dept: FAMILY MEDICINE CLINIC | Facility: CLINIC | Age: 79
End: 2024-01-09
Payer: MEDICARE

## 2024-01-09 VITALS
WEIGHT: 143.9 LBS | SYSTOLIC BLOOD PRESSURE: 120 MMHG | DIASTOLIC BLOOD PRESSURE: 82 MMHG | HEIGHT: 61 IN | BODY MASS INDEX: 27.17 KG/M2

## 2024-01-09 DIAGNOSIS — Z00.00 ENCOUNTER FOR SUBSEQUENT ANNUAL WELLNESS VISIT (AWV) IN MEDICARE PATIENT: Primary | ICD-10-CM

## 2024-01-09 DIAGNOSIS — I10 PRIMARY HYPERTENSION: ICD-10-CM

## 2024-01-09 DIAGNOSIS — M15.9 PRIMARY OSTEOARTHRITIS INVOLVING MULTIPLE JOINTS: ICD-10-CM

## 2024-01-09 DIAGNOSIS — E78.2 MIXED HYPERLIPIDEMIA: ICD-10-CM

## 2024-01-09 DIAGNOSIS — J30.1 ALLERGIC RHINITIS DUE TO POLLEN, UNSPECIFIED SEASONALITY: ICD-10-CM

## 2024-01-09 PROCEDURE — 3074F SYST BP LT 130 MM HG: CPT | Performed by: FAMILY MEDICINE

## 2024-01-09 PROCEDURE — 1170F FXNL STATUS ASSESSED: CPT | Performed by: FAMILY MEDICINE

## 2024-01-09 PROCEDURE — 3079F DIAST BP 80-89 MM HG: CPT | Performed by: FAMILY MEDICINE

## 2024-01-09 PROCEDURE — G0439 PPPS, SUBSEQ VISIT: HCPCS | Performed by: FAMILY MEDICINE

## 2024-01-09 PROCEDURE — G0008 ADMIN INFLUENZA VIRUS VAC: HCPCS | Performed by: FAMILY MEDICINE

## 2024-01-09 PROCEDURE — 90662 IIV NO PRSV INCREASED AG IM: CPT | Performed by: FAMILY MEDICINE

## 2024-01-09 RX ORDER — FLUTICASONE PROPIONATE 50 MCG
2 SPRAY, SUSPENSION (ML) NASAL DAILY
Qty: 16 G | Refills: 11 | Status: SHIPPED | OUTPATIENT
Start: 2024-01-09

## 2024-01-09 RX ORDER — LISINOPRIL 20 MG/1
TABLET ORAL EVERY 24 HOURS
COMMUNITY
End: 2024-01-09

## 2024-01-09 RX ORDER — HYDROCHLOROTHIAZIDE 25 MG/1
25 TABLET ORAL DAILY
Qty: 90 TABLET | Refills: 3 | Status: SHIPPED | OUTPATIENT
Start: 2024-01-09

## 2024-01-09 RX ORDER — LEVOCETIRIZINE DIHYDROCHLORIDE 5 MG/1
5 TABLET, FILM COATED ORAL EVERY EVENING
Qty: 90 TABLET | Refills: 3 | Status: SHIPPED | OUTPATIENT
Start: 2024-01-09

## 2024-01-09 RX ORDER — ATORVASTATIN CALCIUM 20 MG/1
20 TABLET, FILM COATED ORAL DAILY
Qty: 90 TABLET | Refills: 3 | Status: SHIPPED | OUTPATIENT
Start: 2024-01-09

## 2024-01-09 NOTE — PROGRESS NOTES
The ABCs of the Annual Wellness Visit  Subsequent Medicare Wellness Visit    Subjective      Idania Phillips is a 78 y.o. female who presents for a Subsequent Medicare Wellness Visit.    The following portions of the patient's history were reviewed and   updated as appropriate: allergies, current medications, past family history, past medical history, past social history, past surgical history, and problem list.    Compared to one year ago, the patient feels her physical   health is the same.    Compared to one year ago, the patient feels her mental   health is the same.    Recent Hospitalizations:  She was not admitted to the hospital during the last year.       Current Medical Providers:  Patient Care Team:  Rubén Coleman MD as PCP - General (Family Medicine)    Outpatient Medications Prior to Visit   Medication Sig Dispense Refill    multivitamin (THERAGRAN) tablet tablet Take  by mouth.      atorvastatin (LIPITOR) 20 MG tablet TAKE 1 TABLET BY MOUTH DAILY. 90 tablet 0    fluticasone (FLONASE) 50 MCG/ACT nasal spray INSTILL 2 SPRAYS INTO THE NOSTRILS EVERY DAY 16 g 0    hydroCHLOROthiazide (HYDRODIURIL) 25 MG tablet TAKE 1 TABLET BY MOUTH EVERY DAY 90 tablet 0    levocetirizine (XYZAL) 5 MG tablet TAKE 1 TABLET BY MOUTH EVERY EVENING. 90 tablet 0    Calcium Carbonate-Vitamin D (CALCIUM-CARB 600 + D PO) Take  by mouth.      lisinopril (PRINIVIL,ZESTRIL) 20 MG tablet Daily.       No facility-administered medications prior to visit.       No opioid medication identified on active medication list. I have reviewed chart for other potential  high risk medication/s and harmful drug interactions in the elderly.        Aspirin is not on active medication list.  Aspirin use is not indicated based on review of current medical condition/s. Risk of harm outweighs potential benefits.  .    Patient Active Problem List   Diagnosis    Bronchitis    Encounter for subsequent annual wellness visit (AWV) in Medicare patient     "Primary hypertension    Mixed hyperlipidemia    Primary osteoarthritis involving multiple joints    Seasonal allergies    At low risk for fall     Advance Care Planning   Advance Care Planning     Advance Directive is on file.  ACP discussion was held with the patient during this visit. Patient has an advance directive in EMR which is still valid.      Objective    Vitals:    24 1030   BP: 120/82   BP Location: Right arm   Patient Position: Sitting   Cuff Size: Adult   Weight: 65.3 kg (143 lb 14.4 oz)   Height: 154.9 cm (61\")     Estimated body mass index is 27.19 kg/m² as calculated from the following:    Height as of this encounter: 154.9 cm (61\").    Weight as of this encounter: 65.3 kg (143 lb 14.4 oz).    BMI is >= 25 and <30. (Overweight) The following options were offered after discussion;: exercise counseling/recommendations and nutrition counseling/recommendations      Does the patient have evidence of cognitive impairment?   No            HEALTH RISK ASSESSMENT    Smoking Status:  Social History     Tobacco Use   Smoking Status Never   Smokeless Tobacco Never     Alcohol Consumption:  Social History     Substance and Sexual Activity   Alcohol Use No     Fall Risk Screen:    STEADI Fall Risk Assessment was completed, and patient is at MODERATE risk for falls. Assessment completed on:2024    Depression Screenin/9/2024    10:22 AM   PHQ-2/PHQ-9 Depression Screening   Little Interest or Pleasure in Doing Things 0-->not at all   Feeling Down, Depressed or Hopeless 0-->not at all   PHQ-9: Brief Depression Severity Measure Score 0       Health Habits and Functional and Cognitive Screenin/9/2024    10:00 AM   Functional & Cognitive Status   Do you have difficulty preparing food and eating? No   Do you have difficulty bathing yourself, getting dressed or grooming yourself? No   Do you have difficulty using the toilet? No   Do you have difficulty moving around from place to place? No   Do " you have trouble with steps or getting out of a bed or a chair? Yes   Current Diet Well Balanced Diet   Dental Exam Up to date   Eye Exam Not up to date   Exercise (times per week) 0 times per week   Current Exercises Include No Regular Exercise   Do you need help using the phone?  No   Are you deaf or do you have serious difficulty hearing?  No   Do you need help to go to places out of walking distance? No   Do you need help shopping? No   Do you need help preparing meals?  No   Do you need help with housework?  No   Do you need help with laundry? No   Do you need help taking your medications? No   Do you need help managing money? No   Do you ever drive or ride in a car without wearing a seat belt? No   Have you felt unusual stress, anger or loneliness in the last month? No   Who do you live with? Spouse   If you need help, do you have trouble finding someone available to you? No   Have you been bothered in the last four weeks by sexual problems? No   Do you have difficulty concentrating, remembering or making decisions? Yes       Age-appropriate Screening Schedule:  Refer to the list below for future screening recommendations based on patient's age, sex and/or medical conditions. Orders for these recommended tests are listed in the plan section. The patient has been provided with a written plan.    Health Maintenance   Topic Date Due    TDAP/TD VACCINES (1 - Tdap) Never done    ZOSTER VACCINE (2 of 3) 03/13/2015    DXA SCAN  05/15/2021    MOST FORM  01/28/2022    HEPATITIS C SCREENING  Never done    INFLUENZA VACCINE  08/01/2023    ANNUAL WELLNESS VISIT  08/24/2023    BMI FOLLOWUP  08/24/2023    COVID-19 Vaccine (4 - 2023-24 season) 09/01/2023    LIPID PANEL  09/30/2023    COLORECTAL CANCER SCREENING  08/14/2028    Pneumococcal Vaccine 65+  Completed                  CMS Preventative Services Quick Reference  Risk Factors Identified During Encounter:    None Identified    The above risks/problems have been  discussed with the patient.  Pertinent information has been shared with the patient in the After Visit Summary.    Diagnoses and all orders for this visit:    1. Encounter for subsequent annual wellness visit (AWV) in Medicare patient (Primary)  -     CBC Auto Differential  -     Comprehensive Metabolic Panel  -     Lipid Panel  -     TSH    2. Mixed hyperlipidemia  -     Comprehensive Metabolic Panel  -     Lipid Panel    3. Primary hypertension  -     CBC Auto Differential  -     Comprehensive Metabolic Panel  -     Lipid Panel  -     TSH    4. Primary osteoarthritis involving multiple joints    5. Allergic rhinitis due to pollen, unspecified seasonality    Other orders  -     levocetirizine (XYZAL) 5 MG tablet; Take 1 tablet by mouth Every Evening.  Dispense: 90 tablet; Refill: 3  -     fluticasone (FLONASE) 50 MCG/ACT nasal spray; 2 sprays into the nostril(s) as directed by provider Daily.  Dispense: 16 g; Refill: 11  -     hydroCHLOROthiazide (HYDRODIURIL) 25 MG tablet; Take 1 tablet by mouth Daily.  Dispense: 90 tablet; Refill: 3  -     atorvastatin (LIPITOR) 20 MG tablet; Take 1 tablet by mouth Daily.  Dispense: 90 tablet; Refill: 3  -     Fluzone High-Dose 65+yrs ([id number])    Updated annual wellness visit checklist.  Immunizations discussed.  Screening up-to-date.  Recommend yearly dental and eye exams. Also discussed monitoring of blood pressure and lipids. We addressed patient self-assessment of health status, frailty, and physical functioning. We reviewed psychosocial risks, behavioral risks, instrumental activities of daily living, and patient health risk assessment. Patient was given a personalized prevention plan.       Follow Up:   Next Medicare Wellness visit to be scheduled in 1 year.      An After Visit Summary and PPPS were made available to the patient.

## 2024-01-10 LAB
ALBUMIN SERPL-MCNC: 4.8 G/DL (ref 3.8–4.8)
ALBUMIN/GLOB SERPL: 1.5 {RATIO} (ref 1.2–2.2)
ALP SERPL-CCNC: 113 IU/L (ref 44–121)
ALT SERPL-CCNC: 12 IU/L (ref 0–32)
AST SERPL-CCNC: 24 IU/L (ref 0–40)
BASOPHILS # BLD AUTO: 0 X10E3/UL (ref 0–0.2)
BASOPHILS NFR BLD AUTO: 0 %
BILIRUB SERPL-MCNC: 0.6 MG/DL (ref 0–1.2)
BUN SERPL-MCNC: 22 MG/DL (ref 8–27)
BUN/CREAT SERPL: 24 (ref 12–28)
CALCIUM SERPL-MCNC: 10.1 MG/DL (ref 8.7–10.3)
CHLORIDE SERPL-SCNC: 98 MMOL/L (ref 96–106)
CHOLEST SERPL-MCNC: 165 MG/DL (ref 100–199)
CO2 SERPL-SCNC: 23 MMOL/L (ref 20–29)
CREAT SERPL-MCNC: 0.9 MG/DL (ref 0.57–1)
EGFRCR SERPLBLD CKD-EPI 2021: 66 ML/MIN/1.73
EOSINOPHIL # BLD AUTO: 0.2 X10E3/UL (ref 0–0.4)
EOSINOPHIL NFR BLD AUTO: 2 %
ERYTHROCYTE [DISTWIDTH] IN BLOOD BY AUTOMATED COUNT: 12.5 % (ref 11.7–15.4)
GLOBULIN SER CALC-MCNC: 3.1 G/DL (ref 1.5–4.5)
GLUCOSE SERPL-MCNC: 83 MG/DL (ref 70–99)
HCT VFR BLD AUTO: 37.8 % (ref 34–46.6)
HDLC SERPL-MCNC: 63 MG/DL
HGB BLD-MCNC: 12.8 G/DL (ref 11.1–15.9)
IMM GRANULOCYTES # BLD AUTO: 0.1 X10E3/UL (ref 0–0.1)
IMM GRANULOCYTES NFR BLD AUTO: 1 %
LDLC SERPL CALC-MCNC: 77 MG/DL (ref 0–99)
LYMPHOCYTES # BLD AUTO: 2.4 X10E3/UL (ref 0.7–3.1)
LYMPHOCYTES NFR BLD AUTO: 24 %
MCH RBC QN AUTO: 34.1 PG (ref 26.6–33)
MCHC RBC AUTO-ENTMCNC: 33.9 G/DL (ref 31.5–35.7)
MCV RBC AUTO: 101 FL (ref 79–97)
MONOCYTES # BLD AUTO: 0.6 X10E3/UL (ref 0.1–0.9)
MONOCYTES NFR BLD AUTO: 6 %
NEUTROPHILS # BLD AUTO: 6.7 X10E3/UL (ref 1.4–7)
NEUTROPHILS NFR BLD AUTO: 67 %
PLATELET # BLD AUTO: 289 X10E3/UL (ref 150–450)
POTASSIUM SERPL-SCNC: 3.5 MMOL/L (ref 3.5–5.2)
PROT SERPL-MCNC: 7.9 G/DL (ref 6–8.5)
RBC # BLD AUTO: 3.75 X10E6/UL (ref 3.77–5.28)
SODIUM SERPL-SCNC: 139 MMOL/L (ref 134–144)
TRIGL SERPL-MCNC: 144 MG/DL (ref 0–149)
TSH SERPL DL<=0.005 MIU/L-ACNC: 2.12 UIU/ML (ref 0.45–4.5)
VLDLC SERPL CALC-MCNC: 25 MG/DL (ref 5–40)
WBC # BLD AUTO: 10 X10E3/UL (ref 3.4–10.8)

## (undated) DEVICE — TUBING, SUCTION, 1/4" X 10', STRAIGHT: Brand: MEDLINE

## (undated) DEVICE — Device: Brand: DEFENDO AIR/WATER/SUCTION AND BIOPSY VALVE

## (undated) DEVICE — CANN NASL CO2 TRULINK W/O2 A/

## (undated) DEVICE — THE TORRENT IRRIGATION SCOPE CONNECTOR IS USED WITH THE TORRENT IRRIGATION TUBING TO PROVIDE IRRIGATION FLUIDS SUCH AS STERILE WATER DURING GASTROINTESTINAL ENDOSCOPIC PROCEDURES WHEN USED IN CONJUNCTION WITH AN IRRIGATION PUMP (OR ELECTROSURGICAL UNIT).: Brand: TORRENT